# Patient Record
Sex: MALE | Race: WHITE | Employment: FULL TIME | URBAN - METROPOLITAN AREA
[De-identification: names, ages, dates, MRNs, and addresses within clinical notes are randomized per-mention and may not be internally consistent; named-entity substitution may affect disease eponyms.]

---

## 2022-08-02 ENCOUNTER — HOSPITAL ENCOUNTER (EMERGENCY)
Age: 47
Discharge: HOME | End: 2022-08-02
Attending: EMERGENCY MEDICINE
Payer: COMMERCIAL

## 2022-08-02 VITALS
HEIGHT: 69 IN | RESPIRATION RATE: 16 BRPM | BODY MASS INDEX: 34.07 KG/M2 | WEIGHT: 230 LBS | TEMPERATURE: 98.7 F | HEART RATE: 88 BPM | SYSTOLIC BLOOD PRESSURE: 137 MMHG | DIASTOLIC BLOOD PRESSURE: 87 MMHG | OXYGEN SATURATION: 95 %

## 2022-08-02 DIAGNOSIS — L02.415 ABSCESS OF RIGHT LOWER LEG: Primary | ICD-10-CM

## 2022-08-02 PROCEDURE — 6370000000 HC RX 637 (ALT 250 FOR IP): Performed by: EMERGENCY MEDICINE

## 2022-08-02 PROCEDURE — 99283 EMERGENCY DEPT VISIT LOW MDM: CPT

## 2022-08-02 PROCEDURE — 10061 I&D ABSCESS COMP/MULTIPLE: CPT

## 2022-08-02 RX ORDER — HYDROCODONE BITARTRATE AND ACETAMINOPHEN 5; 325 MG/1; MG/1
1 TABLET ORAL EVERY 8 HOURS PRN
Qty: 9 TABLET | Refills: 0 | Status: SHIPPED | OUTPATIENT
Start: 2022-08-02 | End: 2022-08-05

## 2022-08-02 RX ORDER — SULFAMETHOXAZOLE AND TRIMETHOPRIM 800; 160 MG/1; MG/1
1 TABLET ORAL 2 TIMES DAILY
Qty: 20 TABLET | Refills: 0 | Status: SHIPPED | OUTPATIENT
Start: 2022-08-02 | End: 2022-08-12

## 2022-08-02 RX ORDER — SULFAMETHOXAZOLE AND TRIMETHOPRIM 800; 160 MG/1; MG/1
1 TABLET ORAL ONCE
Status: COMPLETED | OUTPATIENT
Start: 2022-08-02 | End: 2022-08-02

## 2022-08-02 RX ORDER — CEPHALEXIN 500 MG/1
TABLET ORAL
COMMUNITY

## 2022-08-02 RX ORDER — IBUPROFEN 400 MG/1
600 TABLET ORAL ONCE
Status: COMPLETED | OUTPATIENT
Start: 2022-08-02 | End: 2022-08-02

## 2022-08-02 RX ORDER — OXYCODONE HYDROCHLORIDE AND ACETAMINOPHEN 5; 325 MG/1; MG/1
1 TABLET ORAL ONCE
Status: DISCONTINUED | OUTPATIENT
Start: 2022-08-02 | End: 2022-08-02 | Stop reason: HOSPADM

## 2022-08-02 RX ADMIN — SULFAMETHOXAZOLE AND TRIMETHOPRIM 1 TABLET: 800; 160 TABLET ORAL at 17:42

## 2022-08-02 RX ADMIN — IBUPROFEN 600 MG: 400 TABLET, FILM COATED ORAL at 17:42

## 2022-08-02 ASSESSMENT — PAIN SCALES - GENERAL: PAINLEVEL_OUTOF10: 6

## 2022-08-02 NOTE — ED PROVIDER NOTES
Emergency Department Encounter    Patient: Luis Ardon  MRN: [id number]  : 1975  Date of Evaluation: 2022  ED Provider:  Giuliana Pritchett DO    Triage Chief Complaint:   Cellulitis (Lower rt leg)    [place]:  Luis Ardon is a 50 y.o. male that presents department complaint of infection to the right leg. Patient states that started on Thursday went to urgent care was placed on Keflex for cellulitis. Patient states he was started to get better redness has improved is able to walk on it but now he has been abscess to the right lower shin. He states 4-10 on the pain scale. States no pus drainage or discharge from the abscess. Patient states he has been taking some ibuprofen for pain. Denies any chest pain short of breath nausea vomiting diarrhea abdominal pain fever chills cough dizzy lightheaded. Patient states he did take 2 ibuprofen this morning has not taken anything since. He states no injury triggered the cellulitis and abscess. He states previous injury to the right leg when he was younger as a kid but no surgeries. He denies any numbness and tingling no discoloration of the feet. Patient here for evaluation. Patient states his tetanus is up-to-date in the last 5 years.     ROS - see HPI, below listed is current ROS at time of my eval:  General:  No fevers, no chills, no weakness  Eyes:  No recent vison changes, no discharge  ENT:  No sore throat, no nasal congestion, no hearing changes  Cardiovascular:  No chest pain, no palpitations  Respiratory:  No shortness of breath, no cough, no wheezing  Gastrointestinal:  No pain, no nausea, no vomiting, no diarrhea  Musculoskeletal:  No muscle pain, no joint pain  Skin: Positive for abscess right anterior lower leg/shin no rash, no pruritis, no easy bruising  Neurologic:  No speech problems, no headache, no extremity numbness, no extremity tingling, no extremity weakness  Psychiatric:  No anxiety  Genitourinary:  No dysuria, no hematuria  Endocrine:  No unexpected weight gain, no unexpected weight loss  Extremities:  no edema, no pain    No past medical history on file. Past Surgical History:   Procedure Laterality Date    BACK SURGERY      HERNIA REPAIR       No family history on file. Social History     Socioeconomic History    Marital status:      Spouse name: Not on file    Number of children: Not on file    Years of education: Not on file    Highest education level: Not on file   Occupational History    Not on file   Tobacco Use    Smoking status: Every Day     Packs/day: 1.00     Types: Cigarettes    Smokeless tobacco: Not on file   Substance and Sexual Activity    Alcohol use: Yes     Comment: occ    Drug use: Not on file    Sexual activity: Not on file   Other Topics Concern    Not on file   Social History Narrative    Not on file     Social Determinants of Health     Financial Resource Strain: Not on file   Food Insecurity: Not on file   Transportation Needs: Not on file   Physical Activity: Not on file   Stress: Not on file   Social Connections: Not on file   Intimate Partner Violence: Not on file   Housing Stability: Not on file     No current facility-administered medications for this encounter. Current Outpatient Medications   Medication Sig Dispense Refill    Cephalexin 500 MG TABS Take by mouth       No Known Allergies    Nursing Notes Reviewed    Physical Exam:  Triage VS:    ED Triage Vitals   Enc Vitals Group      BP 08/02/22 1652 137/87      Heart Rate 08/02/22 1651 88      Resp 08/02/22 1651 16      Temp 08/02/22 1651 98.7 °F (37.1 °C)      Temp src --       SpO2 08/02/22 1651 95 %      Weight 08/02/22 1651 230 lb (104.3 kg)      Height 08/02/22 1651 5' 9\" (1.753 m)      Head Circumference --       Peak Flow --       Pain Score --       Pain Loc --       Pain Edu? --       Excl. in [address]? --        My pulse ox interpretation is - normal    General appearance:  No acute distress. Skin:  Warm. Dry.    Eye: Extraocular movements intact. Ears, nose, mouth and throat:  Oral mucosa moist   Neck:  Trachea midline. Extremity: 2 cm abscess at the right lower anterior leg shin, fluctuance noted, no pus drainage or discharge noted mild erythema around the area, intact dorsalis pedis posterior tibialis and popliteal pulse of the right lower extremity, normal capillary refill each of the digits of the right lower extremity, no swelling. Normal ROM     Heart:  Regular rate and rhythm, normal S1 & S2, no extra heart sounds. Perfusion:  intact  Respiratory:  Lungs clear to auscultation bilaterally. Respirations nonlabored. Abdominal:  Normal bowel sounds. Soft. Nontender. Non distended. Back:  No CVA tenderness to palpation     Neurological:  Alert and oriented times 3. No focal neuro deficits. Psychiatric:  Appropriate    I have reviewed and interpreted all of the currently available lab results from this visit (if applicable):  No results found for this visit on 08/02/22. Radiographs (if obtained):  Radiologist's Report Reviewed:  No results found.     EKG (if obtained): (All EKG's are interpreted by myself in the absence of a cardiologist)    Incision/Drainage    Date/Time: 8/2/2022 5:43 PM  Performed by: Christelle Hubbard DO  Authorized by: Christelle Hubbard DO     Consent:     Consent obtained:  Verbal    Consent given by:  Patient    Risks, benefits, and alternatives were discussed: yes      Risks discussed:  Bleeding, incomplete drainage, pain and infection    Alternatives discussed:  No treatment  Universal protocol:     Procedure explained and questions answered to patient or proxy's satisfaction: yes      Patient identity confirmed:  Verbally with patient, hospital-assigned identification number and arm band  Location:     Type:  Abscess    Size:  2    Location:  Lower extremity    Lower extremity location:  Leg    Leg location:  R lower leg  Pre-procedure details:     Skin preparation:  Povidone-iodine  Sedation:     Sedation type:  None  Anesthesia:     Anesthesia method:  Local infiltration    Local anesthetic:  Bupivacaine 0.25% WITH epi  Procedure type:     Complexity:  Simple  Procedure details:     Ultrasound guidance: no      Needle aspiration: no      Incision types:  Single straight    Incision depth:  Subcutaneous    Wound management:  Probed and deloculated, irrigated with saline and extensive cleaning    Drainage:  Serosanguinous and purulent    Drainage amount: Moderate    Wound treatment:  Drain placed    Packing materials:  1/4 in iodoform gauze    Amount 1/4\" iodoform:  6inches  Post-procedure details:     Procedure completion:  Tolerated well, no immediate complications    MDM:  Patient presents emergency department for abscess to the right lower anterior leg. Patient does have a 2 cm abscess with fluctuance noted. Discussed with patient about incision and drainage he was agreeable risk and benefit explained to patient. Patient tolerated procedure well of incision and drainage of abscess. Packing was placed to the area. Patient follow-up PCP or emergency department in 2 days for wound check and packing removal.  Patient will be placed on Bactrim antibiotic to go along With he was already prescribed. Patient will be placed on Norco for pain. Patient was given a Percocet ibuprofen and Bactrim in the emergency department prior to discharge. Patient instructed to keep wound clean and dry. Patient is return immediately for any worsening symptoms pus drainage discharge. All questions answered. Clinical Impression:  1.  Abscess of right lower leg      Disposition referral (if applicable):  [facility]  [address]  [phone]  Go in 2 days  For wound re-check packing removal  Disposition medications (if applicable):  New Prescriptions    HYDROCODONE-ACETAMINOPHEN (NORCO) 5-325 MG PER TABLET    Take 1 tablet by mouth every 8 hours as needed for Pain for up to 3 days. Intended supply: 3 days. Take lowest dose possible to manage pain    SULFAMETHOXAZOLE-TRIMETHOPRIM (BACTRIM DS) 800-160 MG PER TABLET    Take 1 tablet by mouth in the morning and 1 tablet before bedtime. Do all this for 10 days. ED Provider Disposition Time  DISPOSITION  5:42 PM        Comment: Please note this report has been produced using speech recognition software and may contain errors related to that system including errors in grammar, punctuation, and spelling, as well as words and phrases that may be inappropriate. Efforts were made to edit the dictations.         Kristie Purcell DO  08/02/22 9488

## 2022-08-02 NOTE — ED NOTES
Nonstick dressing and coban applied to the wound on the lower rt leg, care instructions reviewed with pt and wife. Verbal acknowledged.      Ludmila Lin RN  08/02/22 2297

## 2022-08-02 NOTE — Clinical Note
Viviana Larson was seen and treated in our emergency department on 8/2/2022. He may return to work on 08/06/2022. If you have any questions or concerns, please don't hesitate to call.       [address] Mat[address] Walker, DO

## 2022-08-02 NOTE — DISCHARGE INSTRUCTIONS
Follow-up with primary care physician or emergency department in 2 days for packing removal and wound check. Take Bactrim antibiotic as prescribed and directed  Continue home Keflex antibiotic as prescribed and directed  Take Norco as needed for pain. No drink or drive while taking  Take ibuprofen as prescribed for inflammation pain and swelling  Return to the emergency department immediately increased pain swelling pus drainage discharge fever chills or worsening symptoms.

## 2022-08-02 NOTE — ED NOTES
Pt seen at urgent care last Thursday , dx cellulitis placed on cephalexin. No improvement with 2 doses remaining.       Luz Maria Carlin RN  08/02/22 9138

## 2022-08-04 ENCOUNTER — HOSPITAL ENCOUNTER (EMERGENCY)
Age: 47
Discharge: HOME | End: 2022-08-04
Attending: STUDENT IN AN ORGANIZED HEALTH CARE EDUCATION/TRAINING PROGRAM
Payer: COMMERCIAL

## 2022-08-04 VITALS
SYSTOLIC BLOOD PRESSURE: 119 MMHG | TEMPERATURE: 97.1 F | DIASTOLIC BLOOD PRESSURE: 86 MMHG | HEART RATE: 74 BPM | BODY MASS INDEX: 34.07 KG/M2 | WEIGHT: 230 LBS | OXYGEN SATURATION: 97 % | HEIGHT: 69 IN | RESPIRATION RATE: 18 BRPM

## 2022-08-04 DIAGNOSIS — Z51.89 WOUND CHECK, ABSCESS: Primary | ICD-10-CM

## 2022-08-04 PROCEDURE — 99282 EMERGENCY DEPT VISIT SF MDM: CPT

## 2022-08-04 RX ORDER — IBUPROFEN 400 MG/1
400 TABLET ORAL EVERY 6 HOURS PRN
COMMUNITY
End: 2022-09-12

## 2022-08-04 ASSESSMENT — PAIN SCALES - GENERAL: PAINLEVEL_OUTOF10: 2

## 2022-08-04 ASSESSMENT — PAIN - FUNCTIONAL ASSESSMENT: PAIN_FUNCTIONAL_ASSESSMENT: 0-10

## 2022-08-04 NOTE — DISCHARGE INSTRUCTIONS
Continue taking antibiotics as prescribed. Return the emergency department or be checked by your primary care doctor if symptoms are worsening while on antibiotics.

## 2022-08-04 NOTE — ED TRIAGE NOTES
Patient was seen here on Tuesday for possible cellulitis on R lower leg. Patient had the area drained and was told to come back in two days for a wound check and he assumes to take the packing out. Patient is able to bear weight. Patient has been compliant with antibiotics.

## 2022-08-04 NOTE — Clinical Note
Jerry Rodriguez was seen and treated in our emergency department on 8/4/2022. He may return to work on 08/08/2022. Ledy Shereen needed to return the emergency department for wound reevaluation. Recommending time off until Monday, 8th. If you have any questions or concerns, please don't hesitate to call.       Gay Peters MD

## 2022-08-04 NOTE — ED NOTES
Patient discharged with no new rx. Patient instructed to continue with antibiotics that were already prescribed. Patient did ask about FMLA paperwork since time off work is greater than 3 days. I explained that would need to be done with his PCP, patient does not have a PCP. I explained that FMLA is not something that the ER handles. Patient verbalized understanding of discharge instructions and follow up care.       Reshma hC RN  08/04/22 1468

## 2022-08-04 NOTE — ED PROVIDER NOTES
HISTORY       Social History     Socioeconomic History    Marital status:      Spouse name: None    Number of children: None    Years of education: None    Highest education level: None   Tobacco Use    Smoking status: Every Day     Packs/day: 1.00     Types: Cigarettes   Vaping Use    Vaping Use: Never used   Substance and Sexual Activity    Alcohol use: Yes     Comment: occ    Drug use: Never       PHYSICAL EXAM       ED Triage Vitals [08/04/22 1528]   BP Temp Temp Source Heart Rate Resp SpO2 Height Weight   119/86 97.1 °F (36.2 °C) Oral 74 18 97 % 5' 9\" (1.753 m) 230 lb (104.3 kg)         Physical Exam  Vitals and nursing note reviewed. Constitutional:       Appearance: He is not toxic-appearing. HENT:      Head: Normocephalic. Eyes:      Extraocular Movements: Extraocular movements intact. Conjunctiva/sclera: Conjunctivae normal.      Pupils: Pupils are equal, round, and reactive to light. Cardiovascular:      Rate and Rhythm: Normal rate. Comments: Normal peripheral perfusion  Pulmonary:      Effort: Pulmonary effort is normal. No respiratory distress. Abdominal:      General: Abdomen is flat. Musculoskeletal:      Cervical back: Normal range of motion. Comments: Open abscess to right lower extremity anterior on shin, open, slight amount of swelling and erythema surrounding, well-healing. Skin:     General: Skin is warm and dry. Neurological:      General: No focal deficit present. Mental Status: He is alert. Psychiatric:         Mood and Affect: Mood normal.         Behavior: Behavior normal.       DIAGNOSTIC RESULTS     EKG: All EKG's are interpreted by me in the absence of a cardiologist.      RADIOLOGY:   Interpretation per the Radiologist below, if available at the time of this note:    No orders to display       LABS:  No results found for this visit on 08/04/22.      EMERGENCY DEPARTMENT COURSE        DIFFERENTIAL DIAGNOSIS/MDM:   Vitals:    Vitals:    08/04/22 1528   BP: 119/86   Pulse: 74   Resp: 18   Temp: 97.1 °F (36.2 °C)   TempSrc: Oral   SpO2: 97%   Weight: 230 lb (104.3 kg)   Height: 5' 9\" (1.753 m)       MDM  Number of Diagnoses or Management Options  Abscess  Diagnosis management comments: 58-year-old male presenting for a wound reevaluation of an abscess to his right lower extremity. 1 strip of packing removed. Patient reports that that was the only strip of packing applied. No other retained packing noted on exam.  Abscess appears well-healing. Patient reports that erythema, swelling, pain is improving. Is taking antibiotics as prescribed. Discussed return precautions. CONSULTS:  None    PROCEDURES:  Unless otherwise noted below, none. Procedures      FINAL IMPRESSION      1. Wound check, abscess          PATIENT REFERRED TO:  No follow-up provider specified. DISCHARGE MEDICATIONS:  New Prescriptions    No medications on file     Controlled Substances Monitoring:     No flowsheet data found.     (Please note that portions of this note were completed with a voice recognition program.  Efforts were made to edit the dictations but occasionally words are mis-transcribed.)    Mohit Torre MD (electronically signed)  Attending Emergency Physician            Mohit Torre MD  08/04/22 5141

## 2022-09-12 ENCOUNTER — HOSPITAL ENCOUNTER (EMERGENCY)
Age: 47
Discharge: HOME | End: 2022-09-12
Attending: EMERGENCY MEDICINE
Payer: COMMERCIAL

## 2022-09-12 VITALS
OXYGEN SATURATION: 99 % | BODY MASS INDEX: 35.44 KG/M2 | DIASTOLIC BLOOD PRESSURE: 77 MMHG | TEMPERATURE: 98.1 F | WEIGHT: 240 LBS | RESPIRATION RATE: 18 BRPM | SYSTOLIC BLOOD PRESSURE: 131 MMHG | HEART RATE: 77 BPM

## 2022-09-12 DIAGNOSIS — M25.462 KNEE EFFUSION, LEFT: ICD-10-CM

## 2022-09-12 DIAGNOSIS — M25.562 ACUTE PAIN OF LEFT KNEE: Primary | ICD-10-CM

## 2022-09-12 PROCEDURE — 99284 EMERGENCY DEPT VISIT MOD MDM: CPT

## 2022-09-12 PROCEDURE — 6360000002 HC RX W HCPCS: Performed by: EMERGENCY MEDICINE

## 2022-09-12 PROCEDURE — 96372 THER/PROPH/DIAG INJ SC/IM: CPT

## 2022-09-12 RX ORDER — KETOROLAC TROMETHAMINE 30 MG/ML
30 INJECTION, SOLUTION INTRAMUSCULAR; INTRAVENOUS ONCE
Status: COMPLETED | OUTPATIENT
Start: 2022-09-12 | End: 2022-09-12

## 2022-09-12 RX ORDER — HYDROCODONE BITARTRATE AND ACETAMINOPHEN 5; 325 MG/1; MG/1
1 TABLET ORAL ONCE
Status: DISCONTINUED | OUTPATIENT
Start: 2022-09-12 | End: 2022-09-12 | Stop reason: HOSPADM

## 2022-09-12 RX ORDER — NAPROXEN 250 MG/1
250 TABLET ORAL 2 TIMES DAILY PRN
Qty: 14 TABLET | Refills: 0 | Status: SHIPPED | OUTPATIENT
Start: 2022-09-12

## 2022-09-12 RX ADMIN — KETOROLAC TROMETHAMINE 30 MG: 30 INJECTION, SOLUTION INTRAMUSCULAR; INTRAVENOUS at 05:49

## 2022-09-12 ASSESSMENT — ENCOUNTER SYMPTOMS
WHEEZING: 0
NAUSEA: 0
SORE THROAT: 0
VOMITING: 0
ABDOMINAL PAIN: 0
DIARRHEA: 0
CONSTIPATION: 0
COUGH: 0
RHINORRHEA: 0
SINUS PRESSURE: 0
SHORTNESS OF BREATH: 0

## 2022-09-12 NOTE — ED PROVIDER NOTES
Emergency Department Encounter    Patient: Mason Viera  MRN: [id number]  : 1975  Date of Evaluation: 2022  ED Provider:  Mario Walsh DO    Triage Chief Complaint:   Knee Pain (Left side)    HPI:  Mason Viera is a 50 y.o. male past medical history as listed below who presents with left knee pain. This started last night, patient had bent down to play with his dog, when he went from sitting to standing, he noticed that this was painful. He denies feeling a pull or a pop. He states he noticed progressively worsening swelling in the knee last night, that continued into this morning. While at rest pain is a 2 out of 10, however with weightbearing pain is a constant, 7 out of 10. Pain is made worse with weightbearing, and is a dull aching sensation. Denies any numbness or tingling in the leg. Patient is on his feet frequently at work as he does work at [employer]. Patient is taking ibuprofen without improvement of symptoms. Patient has never injured this knee before. Denies F/C, CP, SOB, palpitations, N/V, abdominal pain, dysuria, diarrhea and constipatin. ROS:  Review of Systems   Constitutional:  Negative for chills and fever. HENT:  Negative for congestion, rhinorrhea, sinus pressure and sore throat. Eyes:  Negative for visual disturbance. Respiratory:  Negative for cough, shortness of breath and wheezing. Cardiovascular:  Negative for chest pain and palpitations. Gastrointestinal:  Negative for abdominal pain, constipation, diarrhea, nausea and vomiting. Genitourinary:  Negative for dysuria, frequency and urgency. Musculoskeletal:  Positive for arthralgias and myalgias. Skin:  Negative for rash. Neurological:  Negative for dizziness and syncope. Psychiatric/Behavioral:  Negative for agitation, behavioral problems and confusion. History reviewed. No pertinent past medical history.   Past Surgical History:   Procedure Laterality Date    BACK SURGERY HERNIA REPAIR       History reviewed. No pertinent family history. Social History     Socioeconomic History    Marital status:      Spouse name: Not on file    Number of children: Not on file    Years of education: Not on file    Highest education level: Not on file   Occupational History    Not on file   Tobacco Use    Smoking status: Every Day     Packs/day: 1.00     Types: Cigarettes    Smokeless tobacco: Not on file   Vaping Use    Vaping Use: Never used   Substance and Sexual Activity    Alcohol use: Yes     Comment: occ    Drug use: Never    Sexual activity: Not on file   Other Topics Concern    Not on file   Social History Narrative    Not on file     Social Determinants of Health     Financial Resource Strain: Not on file   Food Insecurity: Not on file   Transportation Needs: Not on file   Physical Activity: Not on file   Stress: Not on file   Social Connections: Not on file   Intimate Partner Violence: Not on file   Housing Stability: Not on file     Current Facility-Administered Medications   Medication Dose Route Frequency Provider Last Rate Last Admin    ketorolac (TORADOL) injection 30 mg  30 mg IntraMUSCular Once Amna Haskins, DO        HYDROcodone-acetaminophen (NORCO) 5-325 MG per tablet 1 tablet  1 tablet Oral Once Amna Haskins, DO         Current Outpatient Medications   Medication Sig Dispense Refill    naproxen (NAPROSYN) 250 MG tablet Take 1 tablet by mouth 2 times daily as needed for Pain 14 tablet 0    Cephalexin 500 MG TABS Take by mouth       No Known Allergies    Nursing Notes Reviewed    Physical Exam:  Triage VS:    ED Triage Vitals [09/12/22 0525]   Enc Vitals Group      /77      Heart Rate 77      Resp 18      Temp 98.1 °F (36.7 °C)      Temp src       SpO2 99 %      Weight 240 lb (108.9 kg)      Height       Head Circumference       Peak Flow       Pain Score       Pain Loc       Pain Edu? Excl. in [address]?       Physical Exam  Vitals and nursing note reviewed. Constitutional:       General: He is not in acute distress. Appearance: Normal appearance. He is not ill-appearing or toxic-appearing. HENT:      Head: Normocephalic and atraumatic. Nose: Nose normal.      Mouth/Throat:      Mouth: Mucous membranes are moist.   Eyes:      Conjunctiva/sclera: Conjunctivae normal.   Cardiovascular:      Rate and Rhythm: Normal rate and regular rhythm. Pulmonary:      Effort: Pulmonary effort is normal. No respiratory distress. Breath sounds: Normal breath sounds. No wheezing, rhonchi or rales. Abdominal:      General: Abdomen is flat. Bowel sounds are normal. There is no distension. Palpations: Abdomen is soft. Tenderness: There is no abdominal tenderness. There is no guarding or rebound. Musculoskeletal:      Comments: Left lower extremity:  DP, PT pulses, distal capillary refill intact. Full range of motion of foot, ankle and hip. Patient does have full range of active motion with both flexion and extension of the knee. Anterior drawer test and posterior drawer test are negative. There is no laxity with valgus or varus stress. There is a prepatellar effusion appreciated that is minimally tender to palpation, with mild erythema and warmth. Patient ambulated into the emergency department, as well as into the room in the emergency department without difficulty or assistance. Skin:     General: Skin is warm. Capillary Refill: Capillary refill takes less than 2 seconds. Neurological:      Mental Status: He is alert and oriented to person, place, and time. Mental status is at baseline. Psychiatric:         Mood and Affect: Mood normal.          Chart review shows recent radiographs:  No results found. MDM:  Patient seen and examined. On exam patient's left lower extremity is neurovascularly intact.   He does have good range of motion actively, and is minimally tender to palpation on exam.  Patient is afebrile, not tachycardic, not tachypneic with blood pressure within acceptable limits, low suspicion for infectious etiology for patient's effusion and pain. Low suspicion for gout. Patient's pain started when he went from bending down to standing while playing with his dog, this is most likely a traumatic effusion. Patient provided with pain control here in the emergency department. Patient given Ace wrap for compression. Patient instructed to rest, ice, compress and elevate his knee. Patient is given work note, he may return to work in 2 days, however he is to have light desk duty and be minimally weightbearing on the left lower extremity. Patient also given orthopedic surgery to follow-up within 2 to 3 days. He can also follow-up with his primary care provider in 2 days. He may return to the emergency department if symptoms worsen, return precautions are discussed, and signs and symptoms of septic joint are also discussed. All questions are answered and patient is agreeable with plan of care. Clinical Impression:  1. Acute pain of left knee    2. Knee effusion, left      Disposition referral (if applicable):  Jose R Anglin MD  [address]  [phone]    Schedule an appointment as soon as possible for a visit in 2 days      [facility]  [address]  [phone]  Go to   As needed, If symptoms worsen  Disposition medications (if applicable):  New Prescriptions    NAPROXEN (NAPROSYN) 250 MG TABLET    Take 1 tablet by mouth 2 times daily as needed for Pain       Comment: Please note this report has been produced using speech recognition software and may contain errors related to that system including errors in grammar, punctuation, and spelling, as well as words and phrases that may be inappropriate. Efforts were made to edit the dictations.        [id number] [place] [address],   09/12/22 0858

## 2022-09-12 NOTE — Clinical Note
Geena Ravi was seen and treated in our emergency department on 9/12/2022. He may return to work on 09/14/2022. Patient to have 1 week of desk duty, and light weightbearing on left leg. If you have any questions or concerns, please don't hesitate to call.       [address] [facility], DO

## 2022-09-12 NOTE — Clinical Note
Ashley Lewis was seen and treated in our emergency department on 9/12/2022. He may return to work on 09/13/2022. Patient to have 1 week of desk duty, and light weightbearing on left leg. If you have any questions or concerns, please don't hesitate to call.       [id number] [facility], DO

## 2023-11-01 ENCOUNTER — OFFICE VISIT (OUTPATIENT)
Dept: ORTHOPEDIC SURGERY | Age: 48
End: 2023-11-01
Payer: COMMERCIAL

## 2023-11-01 VITALS
OXYGEN SATURATION: 97 % | WEIGHT: 240 LBS | HEIGHT: 69 IN | BODY MASS INDEX: 35.55 KG/M2 | RESPIRATION RATE: 16 BRPM | HEART RATE: 87 BPM

## 2023-11-01 DIAGNOSIS — M17.12 ARTHRITIS OF KNEE, LEFT: Primary | ICD-10-CM

## 2023-11-01 PROCEDURE — 99203 OFFICE O/P NEW LOW 30 MIN: CPT | Performed by: PHYSICIAN ASSISTANT

## 2023-11-01 RX ORDER — PREDNISONE 20 MG/1
20 TABLET ORAL 2 TIMES DAILY
Qty: 14 TABLET | Refills: 0 | Status: SHIPPED | OUTPATIENT
Start: 2023-11-01 | End: 2023-11-08

## 2023-11-01 ASSESSMENT — ENCOUNTER SYMPTOMS
GASTROINTESTINAL NEGATIVE: 1
RESPIRATORY NEGATIVE: 1
EYES NEGATIVE: 1

## 2023-11-01 NOTE — PROGRESS NOTES
Bianka Person is a 46y.o. y.o. year old male who complains of Left knee pain and giving out, pain located medial side of the knee, and popping sensation. Unknown injury. Pain started 3-4 weeks ago starting to get better, but knee is still wanting give out. Helps the most ibuprofen sitting. Helps the least stairs, walking, sitting to position. Pain scale  2-3/10.     Occupation: [employer]

## 2023-11-01 NOTE — PROGRESS NOTES
Review of Systems   Constitutional: Negative. HENT: Negative. Eyes: Negative. Respiratory: Negative. Cardiovascular: Negative. Gastrointestinal: Negative. Genitourinary: Negative. Musculoskeletal:  Positive for arthralgias and myalgias. Skin: Negative. Negative for rash and wound. Neurological: Negative. Psychiatric/Behavioral: Negative. HPI:  Janey Kirkland is a 46 y.o. male that presents to the office today with complaints of left knee pain mostly on the medial aspect of the left knee. He states that the knee feels weak. He states that the knee aches. He states that this possibly started back around Labor Day when [place] [employer] [employer] and slipped on the boat deck and then pushed himself off the deck to avoid people that were underneath him. He states that for the most part the knee got better after that into the last couple of weeks and the knee just really started to hurt him. He takes ibuprofen with mild relief. Over the last 2 days he feels like the knee is actually started to feel better but is not back to normal yet. He states there was some popping within the knee but no catching or locking. Has not had a steroid injection in the knee and he has not had any surgery on this knee in the past.    History reviewed. No pertinent past medical history. Past Surgical History:   Procedure Laterality Date    BACK SURGERY      HERNIA REPAIR         History reviewed. No pertinent family history.     Social History     Socioeconomic History    Marital status:      Spouse name: None    Number of children: None    Years of education: None    Highest education level: None   Tobacco Use    Smoking status: Every Day     Packs/day: 1     Types: Cigarettes   Vaping Use    Vaping Use: Never used   Substance and Sexual Activity    Alcohol use: Yes     Comment: occ    Drug use: Never       Current Outpatient Medications   Medication Sig Dispense Refill

## 2023-11-01 NOTE — PATIENT INSTRUCTIONS
Oral Steroid given in office today  Please complete the steroid and if no better 2 weeks after than please call in for a steroid injection into the left knee    We are committed to providing you the best care possible. If you receive a survey after visiting one of our offices, please take time to share your experience concerning your physician office visit. These surveys are confidential and no health information about you is shared. We are eager to improve for you and we are counting on your feedback to help make that happen.

## 2024-03-04 LAB
ALBUMIN SERPL-MCNC: 4.4 G/DL
ALP BLD-CCNC: 61 U/L
ALT SERPL-CCNC: 21 U/L
ANION GAP SERPL CALCULATED.3IONS-SCNC: 1.8 MMOL/L
AST SERPL-CCNC: 18 U/L
BASOPHILS ABSOLUTE: 0.1 /ΜL
BASOPHILS RELATIVE PERCENT: 1 %
BILIRUB SERPL-MCNC: 0.5 MG/DL (ref 0.1–1.4)
BUN BLDV-MCNC: 10 MG/DL
CALCIUM SERPL-MCNC: 9.5 MG/DL
CHLORIDE BLD-SCNC: 102 MMOL/L
CHOLESTEROL, TOTAL: 187 MG/DL
CHOLESTEROL/HDL RATIO: NORMAL
CO2: 25 MMOL/L
CREAT SERPL-MCNC: 1.02 MG/DL
EGFR: 90
EOSINOPHILS ABSOLUTE: 0.3 /ΜL
EOSINOPHILS RELATIVE PERCENT: 4 %
GLUCOSE BLD-MCNC: 107 MG/DL
HCT VFR BLD CALC: 44.5 % (ref 41–53)
HDLC SERPL-MCNC: 45 MG/DL (ref 35–70)
HEMOGLOBIN: 15.2 G/DL (ref 13.5–17.5)
LDL CHOLESTEROL CALCULATED: 114 MG/DL (ref 0–160)
LYMPHOCYTES ABSOLUTE: 2 /ΜL
LYMPHOCYTES RELATIVE PERCENT: 27 %
MCH RBC QN AUTO: 30.5 PG
MCHC RBC AUTO-ENTMCNC: 34.2 G/DL
MCV RBC AUTO: 89 FL
MONOCYTES ABSOLUTE: 0.8 /ΜL
MONOCYTES RELATIVE PERCENT: 11 %
NEUTROPHILS ABSOLUTE: 4.1 /ΜL
NEUTROPHILS RELATIVE PERCENT: 56 %
NONHDLC SERPL-MCNC: NORMAL MG/DL
PLATELET # BLD: 231 K/ΜL
PMV BLD AUTO: NORMAL FL
POTASSIUM SERPL-SCNC: 4.4 MMOL/L
RBC # BLD: 4.99 10^6/ΜL
SODIUM BLD-SCNC: 140 MMOL/L
TOTAL PROTEIN: 6.9
TRIGL SERPL-MCNC: 157 MG/DL
TSH SERPL DL<=0.05 MIU/L-ACNC: 2.12 UIU/ML
VLDLC SERPL CALC-MCNC: 28 MG/DL
WBC # BLD: 7.2 10^3/ML

## 2024-03-29 ENCOUNTER — TELEPHONE (OUTPATIENT)
Dept: CARDIOLOGY CLINIC | Age: 49
End: 2024-03-29

## 2024-03-29 NOTE — TELEPHONE ENCOUNTER
Left message for patient requesting a return call to schedule a consult with [facility] in [place] for annual physical exam per referral from Dr. Constantino.

## 2024-04-12 ENCOUNTER — INITIAL CONSULT (OUTPATIENT)
Dept: CARDIOLOGY CLINIC | Age: 49
End: 2024-04-12

## 2024-04-12 VITALS
BODY MASS INDEX: 34.87 KG/M2 | HEIGHT: 69 IN | SYSTOLIC BLOOD PRESSURE: 130 MMHG | DIASTOLIC BLOOD PRESSURE: 82 MMHG | WEIGHT: 235.4 LBS | HEART RATE: 91 BPM

## 2024-04-12 DIAGNOSIS — R00.2 PALPITATION: ICD-10-CM

## 2024-04-12 DIAGNOSIS — R00.2 PALPITATIONS: ICD-10-CM

## 2024-04-12 DIAGNOSIS — I49.3 PVC (PREMATURE VENTRICULAR CONTRACTION): ICD-10-CM

## 2024-04-12 DIAGNOSIS — R06.83 SNORES: Primary | ICD-10-CM

## 2024-04-12 RX ORDER — FAMOTIDINE 20 MG/1
20 TABLET, FILM COATED ORAL DAILY
COMMUNITY

## 2024-04-12 RX ORDER — MAGNESIUM OXIDE 400 MG/1
400 TABLET ORAL DAILY
Qty: 30 TABLET | Refills: 1 | Status: SHIPPED | OUTPATIENT
Start: 2024-04-12

## 2024-04-12 NOTE — ASSESSMENT & PLAN NOTE
Advised to cut down and wean himself off caffeine Mountain Dew, check 48-hour Holter monitor to see PVC burden, get stress test to see PVC burden with exercise, get echo to rule out structural heart disease, get labs including TSH, start magnesium 400 mg daily, also check for sleep apnea

## 2024-04-12 NOTE — PROGRESS NOTES
aches , swelling  or pain   Integumentary: No rash or pruritis  Neurological: No TIA or stroke symptoms  Psychiatric: No anxiety or depression  Endocrine: No malaise, fatigue or temperature intolerance  Hematologic/Lymphatic: No bleeding problems, blood clots or swollen lymph nodes  Allergic/Immunologic: No nasal congestion or hives    Objective:      Physical Exam:  /82 (Site: Left Upper Arm, Position: Sitting, Cuff Size: Medium Adult)   Pulse 91   Ht 1.753 m (5' 9\")   Wt 106.8 kg (235 lb 6.4 oz)   BMI 34.76 kg/m²   Wt Readings from Last 3 Encounters:   04/12/24 106.8 kg (235 lb 6.4 oz)   11/01/23 108.9 kg (240 lb)   09/12/22 108.9 kg (240 lb)     Body mass index is 34.76 kg/m².  Vitals:    04/12/24 0846   BP: 130/82   Pulse: 91        General Appearance:  No distress, conversant  Constitutional:  Well developed, Well nourished, No acute distress, Non-toxic appearance.   HENT:  Normocephalic, Atraumatic, Bilateral external ears normal, Oropharynx moist, No oral exudates, Nose normal. Neck- Normal range of motion, No tenderness, Supple, No stridor,no apical-carotid delay  Eyes:  PERRL, EOMI, Conjunctiva normal, No discharge.   Respiratory:  Normal breath sounds, No respiratory distress, No wheezing, No chest tenderness.,no use of accessory muscles, NO crackles  Cardiovascular: (PMI) apex non displaced,no lifts no thrills,S1 and S2 audible, No added heart sounds, No signs of ankle edema, or volume overload, No evidence of JVD, No crackles   GI:  Bowel sounds normal, Soft, No tenderness, No masses, No gross visceromegaly   :  No costovertebral angle tenderness   Musculoskeletal:  No edema, no tenderness, no deformities. Back- no tenderness  Integument:  Well hydrated, no rash   Lymphatic:  No lymphadenopathy noted   Neurologic:  Alert & oriented x 3, CN 2-12 normal, normal motor function, normal sensory function, no focal deficits noted   Psychiatric:  Speech and behavior appropriate       Medical decision

## 2024-04-12 NOTE — PATIENT INSTRUCTIONS
We are committed to providing you the best care possible.    If you receive a survey after visiting one of our offices, please take time to share your experience concerning your physician office visit.  These surveys are confidential and no health information about you is shared.    We are eager to improve for you and we are counting on your feedback to help make that happen.    **It is YOUR responsibilty to bring medication bottles and/or updated medication list to EACH APPOINTMENT. This will allow us to better serve you and all your healthcare needs**    Thank you for allowing us to care for you today!   We want to ensure we can follow your treatment plan and we strive to give you the best outcomes and experience possible.   If you ever have a life threatening emergency and call 911 - for an ambulance (EMS)   Our providers can only care for you at:   [facility] or [facility].   Even if you have someone take you or you drive yourself we can only care for you in a [facility] facility. Our providers are not setup at the other healthcare locations!     Please be informed that if you contact our office outside of normal business hours the physician on call cannot help with any scheduling or rescheduling issues, procedure instruction questions or any type of medication issue.    We advise you for any urgent/emergency that you go to the nearest emergency room!    PLEASE CALL OUR OFFICE DURING NORMAL BUSINESS HOURS    Monday - Friday   8 am to 5 pm    [place]: [phone]    [place]: [phone]    [place]:  [phone]

## 2024-05-10 ENCOUNTER — TELEPHONE (OUTPATIENT)
Dept: CARDIOLOGY CLINIC | Age: 49
End: 2024-05-10

## 2024-05-10 NOTE — TELEPHONE ENCOUNTER
Echo     Left Ventricle: Normal left ventricular systolic function with a visually estimated EF of 55 - 60%. Left ventricle size is normal. Normal wall thickness. Normal wall motion. Normal diastolic function.    No significant valvular disease noted.    Mitral Valve: Mild regurgitation.    Tricuspid Valve: Physiologically normal regurgitation. The estimated RVSP is 26 mmHg.    Pericardium: No pericardial effusion.    Image quality is good.     Left msg on  regarding results.

## 2024-05-14 ENCOUNTER — OFFICE VISIT (OUTPATIENT)
Dept: CARDIOLOGY CLINIC | Age: 49
End: 2024-05-14
Payer: COMMERCIAL

## 2024-05-14 ENCOUNTER — TELEPHONE (OUTPATIENT)
Dept: CARDIOLOGY CLINIC | Age: 49
End: 2024-05-14

## 2024-05-14 VITALS
SYSTOLIC BLOOD PRESSURE: 108 MMHG | HEART RATE: 85 BPM | HEIGHT: 69 IN | DIASTOLIC BLOOD PRESSURE: 68 MMHG | WEIGHT: 231.8 LBS | BODY MASS INDEX: 34.33 KG/M2 | OXYGEN SATURATION: 96 %

## 2024-05-14 DIAGNOSIS — I49.3 PVC (PREMATURE VENTRICULAR CONTRACTION): Primary | ICD-10-CM

## 2024-05-14 DIAGNOSIS — R00.2 PALPITATION: ICD-10-CM

## 2024-05-14 PROCEDURE — 99214 OFFICE O/P EST MOD 30 MIN: CPT | Performed by: INTERNAL MEDICINE

## 2024-05-14 RX ORDER — MAGNESIUM OXIDE 400 MG/1
400 TABLET ORAL DAILY
Qty: 30 TABLET | Refills: 1 | Status: SHIPPED | OUTPATIENT
Start: 2024-05-14

## 2024-05-14 NOTE — PROGRESS NOTES
CARDIOLOGY  NOTE    Chief Complaint: PVCs palpitation    HPI:   Christopher is a 48 y.o. year old who has Past medical history as noted below.  Comes in for evaluation due to feeling heartbeats and sensation all the way down to his legs intermittently for the last 6 months EKG shows frequent PVCs and bigeminy  Feeling pulsations all over body EKG shows PVC  On holter he had 13 % PVC burden , on treadmill he had less PVC with exertion   Gained weight, TSH is normal   Smokes pack a day  Father had heart surgery  Gest lightheaded some times  he did not get sleep study but will think about it   He denies  any energy drink and coffee drinks mountain dew 2 bottles a day     Current Outpatient Medications   Medication Sig Dispense Refill    magnesium oxide (MAG-OX) 400 MG tablet Take 1 tablet by mouth daily 30 tablet 1    metoprolol tartrate (LOPRESSOR) 25 MG tablet Take 1 tablet by mouth 2 times daily 60 tablet 3    famotidine (PEPCID) 20 MG tablet Take 1 tablet by mouth daily      naproxen (NAPROSYN) 250 MG tablet Take 1 tablet by mouth 2 times daily as needed for Pain (Patient not taking: Reported on 4/12/2024) 14 tablet 0    Cephalexin 500 MG TABS Take by mouth (Patient not taking: Reported on 4/12/2024)       No current facility-administered medications for this visit.       Allergies:   Patient has no known allergies.    Patient History:  No past medical history on file.  Past Surgical History:   Procedure Laterality Date    BACK SURGERY      HERNIA REPAIR       No family history on file.  Social History     Tobacco Use    Smoking status: Every Day     Current packs/day: 1.00     Types: Cigarettes    Smokeless tobacco: Not on file   Substance Use Topics    Alcohol use: Yes     Comment: occ        Review of Systems:   Constitutional: No Fever or Weight Loss   Eyes: No Decreased Vision  ENT: No Headaches, Hearing Loss or Vertigo  Cardiovascular: as per note above   Respiratory: No

## 2024-05-14 NOTE — PATIENT INSTRUCTIONS
Please be informed that if you contact our office outside of normal business hours the physician on call cannot help with any scheduling or rescheduling issues, procedure instruction questions or any type of medication issue.    We advise you for any urgent/emergency that you go to the nearest emergency room!    PLEASE CALL OUR OFFICE DURING NORMAL BUSINESS HOURS    Monday - Friday   8 am to 5 pm    [place]: [phone]    [place]: [phone]    [place]:  [phone]    **It is YOUR responsibilty to bring medication bottles and/or updated medication list to EACH APPOINTMENT. This will allow us to better serve you and all your healthcare needs**    Thank you for allowing us to care for you today!   We want to ensure we can follow your treatment plan and we strive to give you the best outcomes and experience possible.   If you ever have a life threatening emergency and call 911 - for an ambulance (EMS)   Our providers can only care for you at:   [facility] or [facility].   Even if you have someone take you or you drive yourself we can only care for you in a [facility] facility. Our providers are not setup at the other healthcare locations!

## 2024-05-14 NOTE — TELEPHONE ENCOUNTER
Called and informed Christopher of time/day of CTA Cardiac  Thursday May 23rd  Arriving at [facility] at 8:30am  NPO 4hrs

## 2024-05-20 ENCOUNTER — TELEPHONE (OUTPATIENT)
Dept: CARDIOLOGY CLINIC | Age: 49
End: 2024-05-20

## 2024-05-20 NOTE — TELEPHONE ENCOUNTER
Call pt to give CTA instructions , pt voiced that he would like to reschedule CTA for sometime in June.

## 2024-06-11 ENCOUNTER — HOSPITAL ENCOUNTER (OUTPATIENT)
Dept: CT IMAGING | Age: 49
Discharge: HOME | End: 2024-06-11
Attending: INTERNAL MEDICINE
Payer: COMMERCIAL

## 2024-06-11 DIAGNOSIS — I49.3 PVC (PREMATURE VENTRICULAR CONTRACTION): ICD-10-CM

## 2024-06-11 DIAGNOSIS — R00.2 PALPITATION: ICD-10-CM

## 2024-06-11 PROCEDURE — 3209999900 CT CARDIAC OVER READ

## 2024-06-11 PROCEDURE — 75574 CT ANGIO HRT W/3D IMAGE: CPT

## 2024-06-11 PROCEDURE — 6360000004 HC RX CONTRAST MEDICATION: Performed by: INTERNAL MEDICINE

## 2024-06-11 RX ADMIN — IOPAMIDOL 115 ML: 755 INJECTION, SOLUTION INTRAVENOUS at 09:53

## 2024-07-31 ENCOUNTER — TELEPHONE (OUTPATIENT)
Dept: CARDIOLOGY CLINIC | Age: 49
End: 2024-07-31

## 2024-08-20 ENCOUNTER — OFFICE VISIT (OUTPATIENT)
Dept: CARDIOLOGY CLINIC | Age: 49
End: 2024-08-20
Payer: COMMERCIAL

## 2024-08-20 VITALS
SYSTOLIC BLOOD PRESSURE: 108 MMHG | WEIGHT: 235.2 LBS | DIASTOLIC BLOOD PRESSURE: 68 MMHG | HEART RATE: 81 BPM | HEIGHT: 69 IN | OXYGEN SATURATION: 98 % | BODY MASS INDEX: 34.83 KG/M2

## 2024-08-20 DIAGNOSIS — Z53.20: ICD-10-CM

## 2024-08-20 DIAGNOSIS — R06.83 SNORES: ICD-10-CM

## 2024-08-20 DIAGNOSIS — R00.2 PALPITATIONS: ICD-10-CM

## 2024-08-20 DIAGNOSIS — I49.3 PVC (PREMATURE VENTRICULAR CONTRACTION): Primary | ICD-10-CM

## 2024-08-20 DIAGNOSIS — I38 VHD (VALVULAR HEART DISEASE): ICD-10-CM

## 2024-08-20 PROCEDURE — 99214 OFFICE O/P EST MOD 30 MIN: CPT | Performed by: NURSE PRACTITIONER

## 2024-08-20 NOTE — PATIENT INSTRUCTIONS
**It is YOUR responsibilty to bring medication bottles and/or updated medication list to EACH APPOINTMENT. This will allow us to better serve you and all your healthcare needs**  Thank you for allowing us to care for you today!   We want to ensure we can follow your treatment plan and we strive to give you the best outcomes and experience possible.   If you ever have a life threatening emergency and call 911 - for an ambulance (EMS)   Our providers can only care for you at:   [facility] or [facility].   Even if you have someone take you or you drive yourself we can only care for you in a [facility]. Our providers are not setup at the other healthcare locations!   Please be informed that if you contact our office outside of normal business hours the physician on call cannot help with any scheduling or rescheduling issues, procedure instruction questions or any type of medication issue.    We advise you for any urgent/emergency that you go to the nearest emergency room!    PLEASE CALL OUR OFFICE DURING NORMAL BUSINESS HOURS    Monday - Friday   8 am to 5 pm    [place]: [phone]    [place]: [phone]    [place]:  [phone]  We are committed to providing you the best care possible.    If you receive a survey after visiting one of our offices, please take time to share your experience concerning your physician office visit.  These surveys are confidential and no health information about you is shared.    We are eager to improve for you and we are counting on your feedback to help make that happen.

## 2024-08-20 NOTE — PROGRESS NOTES
daily  Patient not taking: Reported on 8/20/2024 5/14/24   Khushboo Zavala MD   naproxen (NAPROSYN) 250 MG tablet Take 1 tablet by mouth 2 times daily as needed for Pain  Patient not taking: Reported on 4/12/2024 9/12/22   Amna Haskins DO   Cephalexin 500 MG TABS Take by mouth  Patient not taking: Reported on 4/12/2024    Provider, MD Yves       Physical Exam  Vitals reviewed.   Constitutional:       General: He is not in acute distress.     Appearance: Normal appearance. He is obese. He is not ill-appearing.   HENT:      Head: Atraumatic.   Neck:      Vascular: No carotid bruit.   Cardiovascular:      Rate and Rhythm: Normal rate and regular rhythm.      Pulses: Normal pulses.      Heart sounds: Normal heart sounds. No murmur heard.  Pulmonary:      Effort: Pulmonary effort is normal. No respiratory distress.      Breath sounds: Normal breath sounds.   Musculoskeletal:         General: No swelling or deformity.      Cervical back: Neck supple. No muscular tenderness.   Neurological:      Mental Status: He is alert.         Monitor:   Monitor worn from 12 April to 14 April 2024 primarily was in sinus rhythm average heart rate was 82 lowest heart was 51 maximum heart rate was 133 supraventricular ectopy burden was less than 0.01% PVC burden was 13.3%.  Short runs of SVT noted frequent PVCs noted and bigeminy noted abnormal monitor    ASSESSMENT/PLAN:  Frequent PVC's  PVC burden 13.3 %  No CAD on cardiac CTA  Echo does not have WMA and EF is 55-60%  He stopped his metoprolol. He missed a does and felt fine so he decided to stop medication all together.   We reviewed risk of worsening condition and cardiac function due to high PVC burden.   He does not want to take medications  We reviewed options for checking monitor now, restarting medications and rechecking monitor, or monitoring symptoms. He does not want to take medications  He states he is willing to monitor symptoms and follow up in 1 month  to discuss again.   He thinks the stress he was under was the entire issue.     VHD  Mild mitral and tricuspid regurgitation   He is not having symptoms from VHD.     Snores  Encouraged to get sleep apnea testing  He does not think he needs tested.     Dyslipidemia   Latest Reference Range & Units Most Recent   Cholesterol, Total mg/dL 187 (E)  3/4/24 00:00   HDL Cholesterol 35 - 70 mg/dL 45 (E)  3/4/24 00:00   LDL Cholesterol 0 - 160 mg/dL 114 (E)  3/4/24 00:00   Triglycerides mg/dL 157 (E)  3/4/24 00:00   VLDL mg/dL 28 (E)  3/4/24 00:00   (E): External lab result  Lipid panel reviewed  Patient LDL is  at goal, HDL is at goal, triglycerides normal  Discussed with the patient the need for exercise, low cholesterol diet, and compliance with medications.        No follow-ups on file.      An electronic signature was used to authenticate this note.    Electronically signed by NATALIA Ricks CNP on 8/20/2024 at 4:15 PM

## 2024-08-26 PROBLEM — I38 VHD (VALVULAR HEART DISEASE): Status: ACTIVE | Noted: 2024-08-26

## 2024-08-26 ASSESSMENT — ENCOUNTER SYMPTOMS
COUGH: 0
SHORTNESS OF BREATH: 0

## 2024-09-30 ENCOUNTER — HOSPITAL ENCOUNTER (EMERGENCY)
Age: 49
Discharge: HOME | End: 2024-09-30
Attending: EMERGENCY MEDICINE
Payer: COMMERCIAL

## 2024-09-30 VITALS
HEART RATE: 89 BPM | DIASTOLIC BLOOD PRESSURE: 93 MMHG | BODY MASS INDEX: 34.07 KG/M2 | RESPIRATION RATE: 19 BRPM | OXYGEN SATURATION: 97 % | TEMPERATURE: 98.5 F | SYSTOLIC BLOOD PRESSURE: 125 MMHG | HEIGHT: 69 IN | WEIGHT: 230 LBS

## 2024-09-30 DIAGNOSIS — B35.3 TINEA PEDIS OF RIGHT FOOT: ICD-10-CM

## 2024-09-30 DIAGNOSIS — L03.115 CELLULITIS OF RIGHT LOWER EXTREMITY: Primary | ICD-10-CM

## 2024-09-30 PROCEDURE — 6370000000 HC RX 637 (ALT 250 FOR IP): Performed by: EMERGENCY MEDICINE

## 2024-09-30 PROCEDURE — 99283 EMERGENCY DEPT VISIT LOW MDM: CPT

## 2024-09-30 RX ORDER — DOXYCYCLINE HYCLATE 100 MG
100 TABLET ORAL 2 TIMES DAILY
Qty: 20 TABLET | Refills: 0 | Status: SHIPPED | OUTPATIENT
Start: 2024-09-30 | End: 2024-10-10

## 2024-09-30 RX ORDER — DOXYCYCLINE HYCLATE 100 MG
100 TABLET ORAL ONCE
Status: COMPLETED | OUTPATIENT
Start: 2024-09-30 | End: 2024-09-30

## 2024-09-30 RX ADMIN — DOXYCYCLINE HYCLATE 100 MG: 100 TABLET, COATED ORAL at 19:55

## 2024-09-30 ASSESSMENT — LIFESTYLE VARIABLES
HOW OFTEN DO YOU HAVE A DRINK CONTAINING ALCOHOL: NEVER
HOW MANY STANDARD DRINKS CONTAINING ALCOHOL DO YOU HAVE ON A TYPICAL DAY: PATIENT DOES NOT DRINK

## 2024-09-30 ASSESSMENT — ENCOUNTER SYMPTOMS
EYES NEGATIVE: 1
GASTROINTESTINAL NEGATIVE: 1
RESPIRATORY NEGATIVE: 1

## 2024-09-30 ASSESSMENT — PAIN SCALES - GENERAL
PAINLEVEL_OUTOF10: 8
PAINLEVEL_OUTOF10: 8

## 2024-09-30 ASSESSMENT — PAIN - FUNCTIONAL ASSESSMENT: PAIN_FUNCTIONAL_ASSESSMENT: 0-10

## 2024-09-30 NOTE — ED PROVIDER NOTES
The history is provided by the patient.   Foot Problem  Patient had athlete's foot on his right foot which then developed a blister like pustule that he ruptured.  Shortly after this the patient began having some increased redness over top of the athlete's foot which then caused some red streaking going up his leg.  The patient has not had any fevers associated with this but there has been some increased soreness.  The patient has been using over-the-counter products to treat his athlete's foot.  The patient states that he has not been having any fever, chills, nausea vomiting or diarrhea.  The patient is also not had any leg swelling or difficulty with ambulation    Review of Systems   Constitutional: Negative.    HENT: Negative.     Eyes: Negative.    Respiratory: Negative.     Cardiovascular: Negative.    Gastrointestinal: Negative.    Genitourinary: Negative.    Musculoskeletal: Negative.    Skin: Negative.    Neurological: Negative.    All other systems reviewed and are negative.      No family history on file.  Social History     Socioeconomic History    Marital status:      Spouse name: Not on file    Number of children: Not on file    Years of education: Not on file    Highest education level: Not on file   Occupational History    Not on file   Tobacco Use    Smoking status: Every Day     Current packs/day: 1.00     Types: Cigarettes    Smokeless tobacco: Not on file   Vaping Use    Vaping status: Never Used   Substance and Sexual Activity    Alcohol use: Yes     Comment: occ    Drug use: Never    Sexual activity: Not on file   Other Topics Concern    Not on file   Social History Narrative    Not on file     Social Determinants of Health     Financial Resource Strain: Not on file   Food Insecurity: Not on file   Transportation Needs: Not on file   Physical Activity: Not on file   Stress: Not on file   Social Connections: Not on file   Intimate Partner Violence: Not on file   Housing Stability: Not on

## 2024-10-07 ENCOUNTER — OFFICE VISIT (OUTPATIENT)
Dept: CARDIOLOGY CLINIC | Age: 49
End: 2024-10-07
Payer: COMMERCIAL

## 2024-10-07 VITALS
HEART RATE: 79 BPM | SYSTOLIC BLOOD PRESSURE: 120 MMHG | HEIGHT: 69 IN | WEIGHT: 240 LBS | OXYGEN SATURATION: 97 % | DIASTOLIC BLOOD PRESSURE: 84 MMHG | BODY MASS INDEX: 35.55 KG/M2

## 2024-10-07 DIAGNOSIS — R00.2 PALPITATION: ICD-10-CM

## 2024-10-07 DIAGNOSIS — R06.83 SNORES: ICD-10-CM

## 2024-10-07 DIAGNOSIS — I49.3 VENTRICULAR ECTOPIC BEATS: ICD-10-CM

## 2024-10-07 DIAGNOSIS — I38 VHD (VALVULAR HEART DISEASE): ICD-10-CM

## 2024-10-07 DIAGNOSIS — I49.3 PVC (PREMATURE VENTRICULAR CONTRACTION): Primary | ICD-10-CM

## 2024-10-07 PROCEDURE — 99214 OFFICE O/P EST MOD 30 MIN: CPT | Performed by: INTERNAL MEDICINE

## 2024-10-07 NOTE — PROGRESS NOTES
CARDIOLOGY  NOTE    Chief Complaint: PVCs palpitation    HPI:   Christopher is a 49 y.o. year old who has Past medical history as noted below.  Comes in for evaluation due to feeling heartbeats and sensation all the way down to his legs intermittently for the last 6 months EKG shows frequent PVCs and bigeminy  Feeling pulsations all over body EKG shows PVC  On holter he had 13 % PVC burden , on treadmill he had less PVC with exertion   He stopped his metoprolol. He missed a does and felt fine so he decided to stop medication all together. He thinks the stress he was under was the entire issue.   Gained weight, TSH is normal   Smokes pack a day  Father had heart surgery  Gest lightheaded some times  he did not get sleep study but will think about it   He denies  any energy drink and coffee drinks mountain dew 2 bottles a day     Current Outpatient Medications   Medication Sig Dispense Refill    doxycycline hyclate (VIBRA-TABS) 100 MG tablet Take 1 tablet by mouth 2 times daily for 10 days 20 tablet 0    famotidine (PEPCID) 20 MG tablet Take 1 tablet by mouth daily      magnesium oxide (MAG-OX) 400 MG tablet Take 1 tablet by mouth daily (Patient not taking: Reported on 8/20/2024) 30 tablet 1    metoprolol tartrate (LOPRESSOR) 25 MG tablet Take 1 tablet by mouth 2 times daily (Patient not taking: Reported on 8/20/2024) 60 tablet 3    naproxen (NAPROSYN) 250 MG tablet Take 1 tablet by mouth 2 times daily as needed for Pain (Patient not taking: Reported on 4/12/2024) 14 tablet 0    Cephalexin 500 MG TABS Take by mouth (Patient not taking: Reported on 4/12/2024)       No current facility-administered medications for this visit.       Allergies:   Patient has no known allergies.    Patient History:  No past medical history on file.  Past Surgical History:   Procedure Laterality Date    BACK SURGERY      HERNIA REPAIR       No family history on file.  Social History     Tobacco Use

## 2024-10-15 ENCOUNTER — TELEPHONE (OUTPATIENT)
Dept: CARDIOLOGY CLINIC | Age: 49
End: 2024-10-15

## 2024-10-22 ENCOUNTER — OFFICE VISIT (OUTPATIENT)
Dept: CARDIOLOGY CLINIC | Age: 49
End: 2024-10-22
Payer: COMMERCIAL

## 2024-10-22 VITALS
SYSTOLIC BLOOD PRESSURE: 128 MMHG | DIASTOLIC BLOOD PRESSURE: 82 MMHG | HEIGHT: 69 IN | BODY MASS INDEX: 35.55 KG/M2 | OXYGEN SATURATION: 97 % | HEART RATE: 77 BPM | WEIGHT: 240 LBS

## 2024-10-22 DIAGNOSIS — R06.83 SNORING: Primary | ICD-10-CM

## 2024-10-22 DIAGNOSIS — I49.3 PVC (PREMATURE VENTRICULAR CONTRACTION): ICD-10-CM

## 2024-10-22 DIAGNOSIS — R00.2 PALPITATION: ICD-10-CM

## 2024-10-22 DIAGNOSIS — R00.2 PALPITATIONS: ICD-10-CM

## 2024-10-22 DIAGNOSIS — I38 VHD (VALVULAR HEART DISEASE): ICD-10-CM

## 2024-10-22 PROCEDURE — 99214 OFFICE O/P EST MOD 30 MIN: CPT | Performed by: INTERNAL MEDICINE

## 2024-10-22 RX ORDER — DILTIAZEM HYDROCHLORIDE 180 MG/1
180 CAPSULE, COATED, EXTENDED RELEASE ORAL DAILY
Qty: 90 CAPSULE | Refills: 3 | Status: SHIPPED | OUTPATIENT
Start: 2024-10-22

## 2024-10-22 NOTE — PATIENT INSTRUCTIONS
Please be informed that if you contact our office outside of normal business hours the physician on call cannot help with any scheduling or rescheduling issues, procedure instruction questions or any type of medication issue.    We advise you for any urgent/emergency that you go to the nearest emergency room!    PLEASE CALL OUR OFFICE DURING NORMAL BUSINESS HOURS    Monday - Friday   8 am to 5 pm    [place]: [phone]    [place]: [phone]    [place]:  [phone]  Thank you for allowing us to care for you today!   We want to ensure we can follow your treatment plan and we strive to give you the best outcomes and experience possible.   If you ever have a life threatening emergency and call 911 - for an ambulance (EMS)   Our providers can only care for you at:   [facility] or [facility].   Even if you have someone take you or you drive yourself we can only care for you in a [facility] facility. Our providers are not setup at the other healthcare locations!   **It is YOUR responsibilty to bring medication bottles and/or updated medication list to EACH APPOINTMENT. This will allow us to better serve you and all your healthcare needs**  We are committed to providing you the best care possible.    If you receive a survey after visiting one of our offices, please take time to share your experience concerning your physician office visit.  These surveys are confidential and no health information about you is shared.    We are eager to improve for you and we are counting on your feedback to help make that happen.

## 2024-10-22 NOTE — PROGRESS NOTES
CARDIOLOGY  NOTE    Chief Complaint: PVCs palpitation    HPI:   Christopher is a 49 y.o. year old who has Past medical history as noted below.  Comes in for evaluation due to feeling heartbeats and sensation all the way down to his legs intermittently for the last 6 months EKG shows frequent PVCs and bigeminy, He is planning to go to [place] for 6 weeks for school   Feeling pulsations all over body EKG shows PVC  On holter he had 13 % PVC burden , on treadmill he had less PVC with exertion   He stopped his metoprolol. He missed a does and felt fine so he decided to stop medication all together. He thinks the stress he was under was the entire issue.   Gained weight, TSH is normal   He still drinks lot of Mountain dew ( has not holger able to cut down)  Smokes pack a day  Father had heart surgery  Gest lightheaded some times  he did not get sleep study but will think about it   He denies  any energy drink and coffee drinks mountain dew 2 bottles a day     Current Outpatient Medications   Medication Sig Dispense Refill    dilTIAZem (CARDIZEM CD) 180 MG extended release capsule Take 1 capsule by mouth daily 90 capsule 3    famotidine (PEPCID) 20 MG tablet Take 1 tablet by mouth daily      naproxen (NAPROSYN) 250 MG tablet Take 1 tablet by mouth 2 times daily as needed for Pain (Patient not taking: Reported on 4/12/2024) 14 tablet 0    Cephalexin 500 MG TABS Take by mouth (Patient not taking: Reported on 4/12/2024)       No current facility-administered medications for this visit.       Allergies:   Patient has no known allergies.    Patient History:  No past medical history on file.  Past Surgical History:   Procedure Laterality Date    BACK SURGERY      HERNIA REPAIR       No family history on file.  Social History     Tobacco Use    Smoking status: Every Day     Current packs/day: 1.00     Types: Cigarettes    Smokeless tobacco: Not on file   Substance Use Topics    Alcohol use: Yes

## 2024-10-23 ENCOUNTER — HOSPITAL ENCOUNTER (OUTPATIENT)
Dept: SLEEP CENTER | Age: 49
Discharge: HOME | End: 2024-10-23
Payer: COMMERCIAL

## 2024-10-23 VITALS
HEIGHT: 69 IN | DIASTOLIC BLOOD PRESSURE: 74 MMHG | BODY MASS INDEX: 35.55 KG/M2 | SYSTOLIC BLOOD PRESSURE: 128 MMHG | OXYGEN SATURATION: 96 % | WEIGHT: 240 LBS | HEART RATE: 92 BPM

## 2024-10-23 DIAGNOSIS — G47.33 OSA (OBSTRUCTIVE SLEEP APNEA): Primary | ICD-10-CM

## 2024-10-23 DIAGNOSIS — G47.10 HYPERSOMNIA: ICD-10-CM

## 2024-10-23 DIAGNOSIS — E66.9 OBESITY (BMI 30-39.9): ICD-10-CM

## 2024-10-23 PROCEDURE — 99211 OFF/OP EST MAY X REQ PHY/QHP: CPT

## 2024-10-23 PROCEDURE — 99204 OFFICE O/P NEW MOD 45 MIN: CPT | Performed by: INTERNAL MEDICINE

## 2024-10-23 ASSESSMENT — SLEEP AND FATIGUE QUESTIONNAIRES
HOW LIKELY ARE YOU TO NOD OFF OR FALL ASLEEP IN A CAR, WHILE STOPPED FOR A FEW MINUTES IN TRAFFIC: WOULD NEVER DOZE
HOW LIKELY ARE YOU TO NOD OFF OR FALL ASLEEP WHILE SITTING AND TALKING TO SOMEONE: WOULD NEVER DOZE
HOW LIKELY ARE YOU TO NOD OFF OR FALL ASLEEP WHILE SITTING AND READING: MODERATE CHANCE OF DOZING
HOW LIKELY ARE YOU TO NOD OFF OR FALL ASLEEP WHILE LYING DOWN TO REST IN THE AFTERNOON WHEN CIRCUMSTANCES PERMIT: HIGH CHANCE OF DOZING
HOW LIKELY ARE YOU TO NOD OFF OR FALL ASLEEP WHILE SITTING QUIETLY AFTER LUNCH WITHOUT ALCOHOL: SLIGHT CHANCE OF DOZING
HOW LIKELY ARE YOU TO NOD OFF OR FALL ASLEEP WHILE SITTING INACTIVE IN A PUBLIC PLACE: SLIGHT CHANCE OF DOZING
HOW LIKELY ARE YOU TO NOD OFF OR FALL ASLEEP WHILE WATCHING TV: MODERATE CHANCE OF DOZING
ESS TOTAL SCORE: 10
HOW LIKELY ARE YOU TO NOD OFF OR FALL ASLEEP WHEN YOU ARE A PASSENGER IN A CAR FOR AN HOUR WITHOUT A BREAK: SLIGHT CHANCE OF DOZING

## 2024-10-23 NOTE — CONSULTS
HST  Loose weight         Obesity (BMI 30-39.9)       Advised to loose weight with diet and exercise                    Additional Plan:     [x]  Sleep hygiene/ relaxation methods & CBTi principles review with patient   [x]  Avoid supine/back sleep until sleep study   [x]  Driving precautions   [x]  Medical consequences of untreated JASON   [x]  Weight loss recommendations   [x]  Diet recommendations   [x]  Exercise   [x]  Advised to quit smoking       []  PFT referral   []  Bariatric Program referral    Total time spent for this encounter: 45 mins    Follow-Up:    No follow-ups on file.    Electronically signed by Dionte Moore MD on 10/23/2024 at 3:43 PM

## 2024-12-23 RX ORDER — DILTIAZEM HYDROCHLORIDE 180 MG/1
180 CAPSULE, COATED, EXTENDED RELEASE ORAL DAILY
Qty: 90 CAPSULE | Refills: 3 | OUTPATIENT
Start: 2024-12-23

## 2024-12-23 NOTE — TELEPHONE ENCOUNTER
Spoke with patient, he was trying to use a old prescription bottle for his refills.  He is calling pharmacy to get his medication filled from his new RX

## 2025-01-09 ENCOUNTER — HOSPITAL ENCOUNTER (OUTPATIENT)
Dept: SLEEP CENTER | Age: 50
Discharge: HOME | End: 2025-01-09
Attending: INTERNAL MEDICINE
Payer: COMMERCIAL

## 2025-01-09 DIAGNOSIS — G47.33 OSA (OBSTRUCTIVE SLEEP APNEA): ICD-10-CM

## 2025-01-09 PROCEDURE — G0399 HOME SLEEP TEST/TYPE 3 PORTA: HCPCS

## 2025-01-09 RX ORDER — DOXYCYCLINE 100 MG/1
1 CAPSULE ORAL 2 TIMES DAILY
COMMUNITY
Start: 2025-01-03

## 2025-01-09 NOTE — PROGRESS NOTES
Christopher Arana  1975  [facility] for set up and instruction of home sleep study with the [facility] unit.  he was instructed on proper set-up and operation of HST unit. Written instructions with set up diagram given for reference and reinforcement of verbal instruction and demonstration. he was able to return demonstration appropriately. No home environment, vision, dexterity, comprehension concerns with this patient based on completed forms and patient interactions. Patient will return unit after one night as instructed.    Electronically signed by Ahmet Hamilton on 1/9/2025 at 3:44 PM

## 2025-01-16 ENCOUNTER — HOSPITAL ENCOUNTER (OUTPATIENT)
Dept: SLEEP CENTER | Age: 50
Discharge: HOME | End: 2025-01-16
Payer: COMMERCIAL

## 2025-01-16 DIAGNOSIS — G47.10 HYPERSOMNIA: ICD-10-CM

## 2025-01-16 DIAGNOSIS — F17.210 CIGARETTE SMOKER: ICD-10-CM

## 2025-01-16 DIAGNOSIS — G47.33 OSA (OBSTRUCTIVE SLEEP APNEA): Primary | ICD-10-CM

## 2025-01-16 DIAGNOSIS — E66.9 OBESITY (BMI 30-39.9): ICD-10-CM

## 2025-01-16 PROCEDURE — 99211 OFF/OP EST MAY X REQ PHY/QHP: CPT

## 2025-01-16 PROCEDURE — 99214 OFFICE O/P EST MOD 30 MIN: CPT | Performed by: INTERNAL MEDICINE

## 2025-01-16 ASSESSMENT — ENCOUNTER SYMPTOMS
EYE DISCHARGE: 0
SHORTNESS OF BREATH: 0
BACK PAIN: 0
COUGH: 0
ABDOMINAL PAIN: 0
ABDOMINAL DISTENTION: 0
EYE ITCHING: 0

## 2025-01-16 NOTE — ASSESSMENT & PLAN NOTE
He has mild JASON with EDS  Advised to for the Auto CPAP  Loose weight  Need 2 week download data

## 2025-01-17 NOTE — PROGRESS NOTES
APAP order sent to [facility].  
APAP thru [employer]  2 month follow up in Dr. Moore's office.  
Normal range of motion.      Cervical back: Normal range of motion and neck supple.   Skin:     General: Skin is warm and dry.   Neurological:      General: No focal deficit present.      Mental Status: He is alert and oriented to person, place, and time.   Psychiatric:         Mood and Affect: Mood normal.         Behavior: Behavior normal.         Radiology: None    Assessment and Plan     Problem List             Diagnosed       Respiratory    JASON (obstructive sleep apnea) - Primary       He has mild JASON with EDS  Advised to for the Auto CPAP  Loose weight  Need 2 week download data         Relevant Orders    DME Order for CPAP as OP       Other    Hypersomnia       He has mild JASON with EDS  Advised to for the Auto CPAP  Loose weight  Need 2 week download data         Obesity (BMI 30-39.9)        Advised to loose weight with diet and exercise            Cigarette smoker       Advised to quit smoking                 Total time spent for this encounter: 35 mins    Follow-Up:    No follow-ups on file.     Progress notes sent to the referring Provider    Dionte Moore MD MD  1/16/2025  9:32 AM

## 2025-03-03 ENCOUNTER — OFFICE VISIT (OUTPATIENT)
Dept: CARDIOLOGY CLINIC | Age: 50
End: 2025-03-03
Payer: COMMERCIAL

## 2025-03-03 VITALS
OXYGEN SATURATION: 98 % | DIASTOLIC BLOOD PRESSURE: 82 MMHG | BODY MASS INDEX: 37.18 KG/M2 | HEART RATE: 77 BPM | SYSTOLIC BLOOD PRESSURE: 128 MMHG | HEIGHT: 69 IN | WEIGHT: 251 LBS

## 2025-03-03 DIAGNOSIS — I38 VHD (VALVULAR HEART DISEASE): ICD-10-CM

## 2025-03-03 DIAGNOSIS — G47.33 OSA (OBSTRUCTIVE SLEEP APNEA): ICD-10-CM

## 2025-03-03 DIAGNOSIS — I49.3 VENTRICULAR ECTOPIC BEATS: ICD-10-CM

## 2025-03-03 DIAGNOSIS — R00.2 PALPITATION: ICD-10-CM

## 2025-03-03 DIAGNOSIS — I49.3 PVC (PREMATURE VENTRICULAR CONTRACTION): Primary | ICD-10-CM

## 2025-03-03 DIAGNOSIS — F17.210 CIGARETTE SMOKER: ICD-10-CM

## 2025-03-03 DIAGNOSIS — G47.10 HYPERSOMNIA: ICD-10-CM

## 2025-03-03 PROCEDURE — 99214 OFFICE O/P EST MOD 30 MIN: CPT | Performed by: INTERNAL MEDICINE

## 2025-03-03 NOTE — PROGRESS NOTES
ventricular systolic function with a visually estimated EF of 55 - 60%. Left ventricle size is normal. Normal wall thickness. Normal wall motion. Normal diastolic function.    No significant valvular disease noted.    Mitral Valve: Mild regurgitation.    Tricuspid Valve: Physiologically normal regurgitation. The estimated RVSP is 26 mmHg.    Pericardium: No pericardial effusion.    Image quality is good.     4/12/2024  Monitor worn from 12 April to 14 April 2024 primarily was in sinus rhythm average heart rate was 82 lowest heart was 51 maximum heart rate was 133 supraventricular ectopy burden was less than 0.01% PVC burden was 13.3%.  Short runs of SVT noted frequent PVCs noted and bigeminy noted abnormal monitor    Cardiac ct 6/11/2024     Left Main Coronary artery:  Left main originates normally from the left coronary sinus and gives rise to the left anterior descending artery and left circumflex artery. It is free of any significant atherosclerotic disease.      Left anterior descending artery:  Left anterior descending artery is a normal caliber vessel that wraps around the apex and gives off diagonal branches. The diagonal branches are free of any significant atherosclerotic disease.. There are minor luminal irregularities noted.     Left circumflex artery:  Left circumflex artery is a moderate size vessel that gives off a marginal branch. The left circumflex artery is free of any significant atherosclerotic disease.      Right coronary artery:  Right coronary artery originates from the right coronary sinus. It seems to be the dominant vessel and gives rise to the posterior descending artery and posterior ventricular artery. . Posterior descending artery and posterior lateral branches are free of any significant disease.     Pericardium:  Pericardium is normal in appearance without any thickening, calcification or pericardial fluid.     Cardiac structures:   The ascending aorta is measured at 33.1mm.

## 2025-03-11 ENCOUNTER — TELEPHONE (OUTPATIENT)
Dept: CARDIOLOGY CLINIC | Age: 50
End: 2025-03-11

## 2025-03-11 NOTE — TELEPHONE ENCOUNTER
Monitor came back with no data.Pt  is leaving [facility] and will call when he is back for a replacement monitor.

## 2025-04-23 RX ORDER — DILTIAZEM HYDROCHLORIDE 180 MG/1
180 CAPSULE, COATED, EXTENDED RELEASE ORAL DAILY
Qty: 90 CAPSULE | Refills: 1 | Status: SHIPPED | OUTPATIENT
Start: 2025-04-23

## 2025-07-29 RX ORDER — DILTIAZEM HYDROCHLORIDE 180 MG/1
180 CAPSULE, COATED, EXTENDED RELEASE ORAL DAILY
Qty: 90 CAPSULE | Refills: 1 | Status: SHIPPED | OUTPATIENT
Start: 2025-07-29

## 2025-07-29 NOTE — TELEPHONE ENCOUNTER
Pt called requesting refill for Diltiazem. Needs sent to [facility] on [facility].